# Patient Record
Sex: FEMALE
[De-identification: names, ages, dates, MRNs, and addresses within clinical notes are randomized per-mention and may not be internally consistent; named-entity substitution may affect disease eponyms.]

---

## 2024-11-18 ENCOUNTER — DASHBOARD ENCOUNTERS (OUTPATIENT)
Age: 29
End: 2024-11-18

## 2024-11-18 ENCOUNTER — OFFICE VISIT (OUTPATIENT)
Dept: URBAN - METROPOLITAN AREA CLINIC 54 | Facility: CLINIC | Age: 29
End: 2024-11-18
Payer: COMMERCIAL

## 2024-11-18 VITALS
DIASTOLIC BLOOD PRESSURE: 90 MMHG | HEIGHT: 60 IN | BODY MASS INDEX: 50.73 KG/M2 | WEIGHT: 258.4 LBS | HEART RATE: 93 BPM | TEMPERATURE: 97.5 F | SYSTOLIC BLOOD PRESSURE: 132 MMHG

## 2024-11-18 DIAGNOSIS — R10.13 EPIGASTRIC PAIN: ICD-10-CM

## 2024-11-18 DIAGNOSIS — R10.30 LOWER ABDOMINAL PAIN: ICD-10-CM

## 2024-11-18 DIAGNOSIS — R11.0 NAUSEA: ICD-10-CM

## 2024-11-18 DIAGNOSIS — R19.7 DIARRHEA, UNSPECIFIED TYPE: ICD-10-CM

## 2024-11-18 PROCEDURE — 99204 OFFICE O/P NEW MOD 45 MIN: CPT

## 2024-11-18 RX ORDER — COLESTIPOL HYDROCHLORIDE 1 G/1
1 TO 2 TABLETS TABLET, FILM COATED ORAL
Qty: 60 | Refills: 5 | OUTPATIENT
Start: 2024-11-18

## 2024-11-18 RX ORDER — CALCIUM CARBONATE 500 MG/1
1 TABLET TABLET ORAL ONCE A DAY
Status: ACTIVE | COMMUNITY

## 2024-11-18 RX ORDER — OMEPRAZOLE 20 MG/1
1 CAPSULE 1/2 TO 1 HOUR BEFORE MORNING MEAL CAPSULE, DELAYED RELEASE ORAL ONCE A DAY
Qty: 30 | Refills: 2 | OUTPATIENT
Start: 2024-11-18

## 2024-11-18 NOTE — HPI-TODAY'S VISIT:
11/18/24: Patient is a 28 yo female with a PMH of CCY in 2018 who presents with multiple complaints including nasuea and diarrhea. Pt reports having diarrhea since CCY, but other symptoms have worsened in the last year. Complains of constant nausea with infrequent vomiting, usually starts when she gets hungry and persists after eating. Has fairly constant epigastric pain, crampy/stinging in character. Sometimes has early satiety but overall appetite is okay. Takes Tylenol daily for headache, infrequntly takes NSAIDs. Pt also reports lower abd cramping associated with defecation. Having about 5 loose BMs/day. No hematochezia or melena. Reports hot flashes and fatigue. Trying to get pregnant. Routine labs this morning with new PCP at [facility], not available.

## 2025-01-21 ENCOUNTER — OFFICE VISIT (OUTPATIENT)
Dept: URBAN - METROPOLITAN AREA CLINIC 54 | Facility: CLINIC | Age: 30
End: 2025-01-21

## 2025-01-21 RX ORDER — COLESTIPOL HYDROCHLORIDE 1 G/1
1 TO 2 TABLETS TABLET, FILM COATED ORAL
Qty: 60 | Refills: 5 | Status: ACTIVE | COMMUNITY
Start: 2024-11-18

## 2025-01-21 RX ORDER — OMEPRAZOLE 20 MG/1
1 CAPSULE 1/2 TO 1 HOUR BEFORE MORNING MEAL CAPSULE, DELAYED RELEASE ORAL ONCE A DAY
Qty: 30 | Refills: 2 | Status: ACTIVE | COMMUNITY
Start: 2024-11-18

## 2025-01-21 RX ORDER — CALCIUM CARBONATE 500 MG/1
1 TABLET TABLET ORAL ONCE A DAY
Status: ACTIVE | COMMUNITY